# Patient Record
Sex: MALE | Employment: OTHER | URBAN - METROPOLITAN AREA
[De-identification: names, ages, dates, MRNs, and addresses within clinical notes are randomized per-mention and may not be internally consistent; named-entity substitution may affect disease eponyms.]

---

## 2023-06-29 RX ORDER — ACETAMINOPHEN 325 MG/1
650 TABLET ORAL EVERY 6 HOURS PRN
COMMUNITY

## 2023-06-29 RX ORDER — CICLOPIROX 7.7 MG/G
GEL TOPICAL 2 TIMES DAILY
COMMUNITY

## 2023-06-29 RX ORDER — CARBAMAZEPINE 200 MG/1
200 TABLET ORAL 2 TIMES DAILY
COMMUNITY

## 2023-06-29 RX ORDER — MAG HYDROX/ALUMINUM HYD/SIMETH 400-400-40
1 SUSPENSION, ORAL (FINAL DOSE FORM) ORAL AS NEEDED
COMMUNITY

## 2023-06-29 RX ORDER — POLYETHYLENE GLYCOL 3350
17 POWDER (GRAM) MISCELLANEOUS DAILY
COMMUNITY
End: 2023-07-05

## 2023-06-29 RX ORDER — GINSENG 100 MG
1 CAPSULE ORAL 2 TIMES DAILY
COMMUNITY

## 2023-06-29 RX ORDER — QUETIAPINE FUMARATE 200 MG/1
200 TABLET, FILM COATED ORAL 3 TIMES DAILY
COMMUNITY

## 2023-06-29 RX ORDER — OLANZAPINE 20 MG/1
20 TABLET ORAL
COMMUNITY

## 2023-06-29 RX ORDER — OLANZAPINE 20 MG/1
20 TABLET ORAL
COMMUNITY
End: 2023-07-05

## 2023-06-29 RX ORDER — MULTIVITAMIN
1 TABLET ORAL DAILY
COMMUNITY

## 2023-06-29 RX ORDER — DIPHENOXYLATE HYDROCHLORIDE AND ATROPINE SULFATE 2.5; .025 MG/1; MG/1
1 TABLET ORAL DAILY
COMMUNITY
End: 2023-07-05

## 2023-06-29 RX ORDER — DOCUSATE SODIUM 100 MG/1
100 CAPSULE, LIQUID FILLED ORAL
COMMUNITY

## 2023-06-29 RX ORDER — CLONAZEPAM 0.5 MG/1
0.5 TABLET ORAL 2 TIMES DAILY
COMMUNITY

## 2023-06-29 RX ORDER — CLONAZEPAM 1 MG/1
1 TABLET ORAL 2 TIMES DAILY
COMMUNITY

## 2023-06-29 RX ORDER — POLYETHYLENE GLYCOL 3350 17 G/17G
17 POWDER, FOR SOLUTION ORAL DAILY
COMMUNITY

## 2023-06-29 RX ORDER — PSEUDOEPHEDRINE HCL 30 MG
100 TABLET ORAL DAILY
COMMUNITY

## 2023-06-29 RX ORDER — PETROLATUM, YELLOW 100 %
JELLY (GRAM) MISCELLANEOUS 2 TIMES DAILY
COMMUNITY

## 2023-07-03 NOTE — PRE-PROCEDURE INSTRUCTIONS
My Surgical Experience    The following information was developed to assist you to prepare for your operation. What do I need to do before coming to the hospital?  • Arrange for a responsible person to drive you to and from the hospital.  • Arrange care for your children at home. Children are not allowed in the recovery areas of the hospital  • Plan to wear clothing that is easy to put on and take off. If you are having shoulder surgery, wear a shirt that buttons or zippers in the front  Bathing  o Shower the evening before and the morning of your surgery with an antibacterial soap. Please refer to the Pre Op Showering Instructions for Surgery Patients Sheet.  o Remove nail polish and all body piercing jewelry  o Do not shave any body part for at least 24 hours before surgery-this includes face, arms, legs and upper body  Food  o Nothing to eat or drink after midnight the night before your surgery. This includes candy and chewing gum  o Exception: If your surgery is after 12:00pm (noon), you may have clear liquids such as 7-Up®, ginger ale, apple or cranberry juice, Jell-O®, water, or clear broth until 8:00 am  o Do not drink milk or juice with pulp on the morning before surgery  o Do not drink alcohol 24 hours before surgery  Medicine  o Follow instructions you received from your surgeon about which medicines you may take on the day of surgery  o If instructed to take medicine on the morning of surgery, take pills with just a small sip of water. Call your prescribing doctor for specific infroamtion on what to do if you take insulin    What should I bring to the hospital?    Bring:  • Crutches or a walker, if you have them, for foot or knee surgery  • A list of the daily medicines, vitamins, minerals, herbals and nutritional supplements you take.  Include the dosages of medicines and the time you take them each day  • Glasses, dentures or hearing aids  • Minimal clothing; you will be wearing hospital sleepwear  • Photo ID; required to verify your identity  • If you have a Living Will or Power of , bring a copy of the documents  • If you have an ostomy, bring an extra pouch and any supplies you use    Do not bring  • Medicines or inhalers  • Money, valuables or jewelry    What other information should I know about the day of surgery? • Notify your surgeons if you develop a cold, sore throat, cough, fever, rash or any other illness. • Report to the Ambulatory Surgical/Same Day Surgery Unit  • You will be instructed to stop at Registration only if you have not been pre-registered  • Inform your  fi they do not stay that they will be asked by the staff to leave a phone number where they can be reached  • Be available to be reached before surgery. In the event the operating room schedule changes, you may be asked to come in earlier or later than expected    *It is important to tell your doctor and others involved in your health care if you are taking or have been taking any non-prescription drugs, vitamins, minerals, herbals or other nutritional supplements. Any of these may interact with some food or medicines and cause a reaction      Pre-Surgery Instructions:   Medication Instructions   • acetaminophen (TYLENOL) 325 mg tablet Hold day of surgery. • bacitracin topical ointment 500 units/g topical ointment Hold day of surgery. • carBAMazepine (TEGretol) 200 mg tablet Take day of surgery. • Ciclopirox 0.77 % gel Hold day of surgery. • clonazePAM (KlonoPIN) 0.5 mg tablet Take day of surgery. • docusate sodium (COLACE) 100 mg capsule Hold day of surgery. • Fexofenadine HCl (MUCINEX ALLERGY PO) Hold day of surgery. • glycerin adult 2 g suppository Hold day of surgery. • OLANZapine (ZyPREXA) 20 MG tablet Take day of surgery. • Polyethylene Glycol 3350 POWD Hold day of surgery. • QUEtiapine (SEROquel) 200 mg tablet Take day of surgery. • white petrolatum ointment Hold day of surgery.

## 2023-07-05 ENCOUNTER — ANESTHESIA EVENT (OUTPATIENT)
Dept: PERIOP | Facility: HOSPITAL | Age: 38
End: 2023-07-05
Payer: COMMERCIAL

## 2023-07-05 RX ORDER — POLYETHYLENE GLYCOL 3350 17 G/17G
17 POWDER, FOR SOLUTION ORAL
COMMUNITY

## 2023-07-06 ENCOUNTER — ANESTHESIA (OUTPATIENT)
Dept: PERIOP | Facility: HOSPITAL | Age: 38
End: 2023-07-06
Payer: COMMERCIAL

## 2023-07-06 ENCOUNTER — HOSPITAL ENCOUNTER (OUTPATIENT)
Facility: HOSPITAL | Age: 38
Setting detail: OUTPATIENT SURGERY
Discharge: ANOTHER HOSPITAL | End: 2023-07-06
Attending: DENTIST | Admitting: DENTIST
Payer: COMMERCIAL

## 2023-07-06 ENCOUNTER — APPOINTMENT (OUTPATIENT)
Dept: RADIOLOGY | Facility: HOSPITAL | Age: 38
End: 2023-07-06
Payer: COMMERCIAL

## 2023-07-06 VITALS
HEIGHT: 62 IN | HEART RATE: 97 BPM | SYSTOLIC BLOOD PRESSURE: 140 MMHG | RESPIRATION RATE: 16 BRPM | WEIGHT: 137.13 LBS | TEMPERATURE: 97.1 F | BODY MASS INDEX: 25.23 KG/M2 | DIASTOLIC BLOOD PRESSURE: 90 MMHG | OXYGEN SATURATION: 95 %

## 2023-07-06 PROBLEM — E78.5 HYPERLIPIDEMIA: Status: ACTIVE | Noted: 2023-07-06

## 2023-07-06 PROBLEM — E11.9 DIABETES MELLITUS, TYPE 2 (HCC): Status: ACTIVE | Noted: 2023-07-06

## 2023-07-06 PROBLEM — R56.9 SEIZURES (HCC): Status: ACTIVE | Noted: 2023-07-06

## 2023-07-06 RX ORDER — DEXAMETHASONE SODIUM PHOSPHATE 10 MG/ML
INJECTION, SOLUTION INTRAMUSCULAR; INTRAVENOUS AS NEEDED
Status: DISCONTINUED | OUTPATIENT
Start: 2023-07-06 | End: 2023-07-06

## 2023-07-06 RX ORDER — PROPOFOL 10 MG/ML
INJECTION, EMULSION INTRAVENOUS AS NEEDED
Status: DISCONTINUED | OUTPATIENT
Start: 2023-07-06 | End: 2023-07-06

## 2023-07-06 RX ORDER — ROCURONIUM BROMIDE 10 MG/ML
INJECTION, SOLUTION INTRAVENOUS AS NEEDED
Status: DISCONTINUED | OUTPATIENT
Start: 2023-07-06 | End: 2023-07-06

## 2023-07-06 RX ORDER — MAGNESIUM HYDROXIDE 1200 MG/15ML
LIQUID ORAL AS NEEDED
Status: DISCONTINUED | OUTPATIENT
Start: 2023-07-06 | End: 2023-07-06 | Stop reason: HOSPADM

## 2023-07-06 RX ORDER — ONDANSETRON 2 MG/ML
INJECTION INTRAMUSCULAR; INTRAVENOUS AS NEEDED
Status: DISCONTINUED | OUTPATIENT
Start: 2023-07-06 | End: 2023-07-06

## 2023-07-06 RX ORDER — BUPIVACAINE HYDROCHLORIDE AND EPINEPHRINE 5; 5 MG/ML; UG/ML
INJECTION, SOLUTION PERINEURAL AS NEEDED
Status: DISCONTINUED | OUTPATIENT
Start: 2023-07-06 | End: 2023-07-06 | Stop reason: HOSPADM

## 2023-07-06 RX ORDER — SODIUM CHLORIDE, SODIUM LACTATE, POTASSIUM CHLORIDE, CALCIUM CHLORIDE 600; 310; 30; 20 MG/100ML; MG/100ML; MG/100ML; MG/100ML
125 INJECTION, SOLUTION INTRAVENOUS CONTINUOUS
Status: DISCONTINUED | OUTPATIENT
Start: 2023-07-06 | End: 2023-07-06 | Stop reason: HOSPADM

## 2023-07-06 RX ORDER — CLINDAMYCIN PHOSPHATE 600 MG/50ML
600 INJECTION INTRAVENOUS
Status: DISCONTINUED | OUTPATIENT
Start: 2023-07-06 | End: 2023-07-06 | Stop reason: HOSPADM

## 2023-07-06 RX ORDER — FENTANYL CITRATE 50 UG/ML
INJECTION, SOLUTION INTRAMUSCULAR; INTRAVENOUS AS NEEDED
Status: DISCONTINUED | OUTPATIENT
Start: 2023-07-06 | End: 2023-07-06

## 2023-07-06 RX ORDER — LIDOCAINE HYDROCHLORIDE 10 MG/ML
INJECTION, SOLUTION EPIDURAL; INFILTRATION; INTRACAUDAL; PERINEURAL AS NEEDED
Status: DISCONTINUED | OUTPATIENT
Start: 2023-07-06 | End: 2023-07-06

## 2023-07-06 RX ORDER — DIPHENHYDRAMINE HCL 25 MG
25 CAPSULE ORAL 3 TIMES DAILY
COMMUNITY

## 2023-07-06 RX ADMIN — ONDANSETRON 4 MG: 2 INJECTION INTRAMUSCULAR; INTRAVENOUS at 14:44

## 2023-07-06 RX ADMIN — PROPOFOL 150 MG: 10 INJECTION, EMULSION INTRAVENOUS at 14:34

## 2023-07-06 RX ADMIN — LIDOCAINE HYDROCHLORIDE 50 MG: 10 INJECTION, SOLUTION EPIDURAL; INFILTRATION; INTRACAUDAL; PERINEURAL at 14:34

## 2023-07-06 RX ADMIN — SODIUM CHLORIDE, SODIUM LACTATE, POTASSIUM CHLORIDE, AND CALCIUM CHLORIDE 125 ML/HR: .6; .31; .03; .02 INJECTION, SOLUTION INTRAVENOUS at 14:26

## 2023-07-06 RX ADMIN — SUGAMMADEX 200 MG: 100 INJECTION, SOLUTION INTRAVENOUS at 15:55

## 2023-07-06 RX ADMIN — DEXAMETHASONE SODIUM PHOSPHATE 10 MG: 10 INJECTION, SOLUTION INTRAMUSCULAR; INTRAVENOUS at 14:44

## 2023-07-06 RX ADMIN — FENTANYL CITRATE 50 MCG: 50 INJECTION, SOLUTION INTRAMUSCULAR; INTRAVENOUS at 14:34

## 2023-07-06 RX ADMIN — ROCURONIUM BROMIDE 40 MG: 10 INJECTION, SOLUTION INTRAVENOUS at 14:34

## 2023-07-06 RX ADMIN — CLINDAMYCIN PHOSPHATE 600 MG: 600 INJECTION, SOLUTION INTRAVENOUS at 14:27

## 2023-07-06 NOTE — OP NOTE
COMPLETE ORAL REHABILITATION, ORAL EXAM UNDER ANESTHESIA, FULL MOUTH DENTAL X-RAYS, PLANING AND SCALING, GINGIVECTOMY, HE CHARTING, PROPHYLAXIS, D/SENSE, COMPOSITE RESTORATION  #21, DFIL#7. MFIL #8, EXTRACTION TEETH # 9, #10, AND #31. Postoperative Note  PATIENT NAME: Shira Mclaughlin  : 1985  MRN: [id number]  [address] [address]    Surgery Date: 2023    Unspecified intellectual disabilities [F79]  Chronic periodontitis, unspecified [K05.30]    Post-Op Diagnosis Codes:     * Unspecified intellectual disabilities [F79]     * Chronic periodontitis, unspecified [K05.30]    Procedure(s):  COMPLETE ORAL REHABILITATION, ORAL EXAM UNDER ANESTHESIA, FULL MOUTH DENTAL X-RAYS, PLANING AND SCALING, GINGIVECTOMY, HE CHARTING, PROPHYLAXIS, D/SENSE, COMPOSITE RESTORATION  #21, DFIL#7. MFIL #8, EXTRACTION TEETH # 9, #10, AND #31. Surgeon(s) and Role:     * New Cano DMD - Primary    Specimens:  none    Estimated Blood Loss:   Minimal    Anesthesia Type:   General     Operative report:            Complications:   None    SIGNATURE: New Cano DMD   DATE: 2023   TIME: 3:59 PM COMPLETE ORAL REHABILITATION, ORAL EXAM UNDER ANESTHESIA, FULL MOUTH DENTAL X-RAYS, PLANING AND SCALING, GINGIVECTOMY, HE CHARTING, PROPHYLAXIS, D/SENSE, COMPOSITE RESTORATION  #21, DFIL#7. MFIL #8, EXTRACTION TEETH # 9, #10, AND #31. Postoperative Note  PATIENT NAME: Shira Mclaughlin  : 1985  MRN: [id number]  [address] [address]    Surgery Date: 2023    Unspecified intellectual disabilities [F79]  Chronic periodontitis, unspecified [K05.30]    Post-Op Diagnosis Codes:     * Unspecified intellectual disabilities [F79]     * Chronic periodontitis, unspecified [K05.30]    Procedure(s):  COMPLETE ORAL REHABILITATION, ORAL EXAM UNDER ANESTHESIA, FULL MOUTH DENTAL X-RAYS, PLANING AND SCALING, GINGIVECTOMY, HE CHARTING, PROPHYLAXIS, D/SENSE, COMPOSITE RESTORATION  #21, DFIL#7.  MFIL #8, EXTRACTION TEETH # 9, #10, AND #31. Surgeon(s) and Role:     * Kita Naranjo DMD - Primary    Specimens:  none    Estimated Blood Loss:   Minimal    Anesthesia Type:   General     Operative report:            Complications:   None    SIGNATURE: Kitaeliana Naranjo DMD   DATE: 2023   TIME: 3:59 PMOPERATIVE REPORT  PATIENT NAME: Karl Salazar    :  1985  MRN: [id number]  Pt Location: [facility] OR ROOM 02    SURGERY DATE: 2023    Surgeon(s) and Role:     * Kita Naranjo DMD - Primary    Preop Diagnosis:  Unspecified intellectual disabilities [F79]  Chronic periodontitis, unspecified [K05.30]    Post-Op Diagnosis Codes:     * Unspecified intellectual disabilities [F79]     * Chronic periodontitis, unspecified [D00.26]    Procedure(s):  COMPLETE ORAL REHABILITATION. ORAL EXAM UNDER ANESTHESIA. FULL MOUTH DENTAL X-RAYS. PLANING AND SCALING. GINGIVECTOMY. HE CHARTING. PROPHYLAXIS. D/SENSE. COMPOSITE RESTORATION  #21. DFIL#7. MFIL #8. EXTRACTION TEETH # 9. #10. AND #31. Specimen(s):  * No specimens in log *    Estimated Blood Loss:   Minimal    Drains:  * No LDAs found *    Anesthesia Type:   General    Operative Indications:  Unspecified intellectual disabilities [F79]  Chronic periodontitis, unspecified [K05.30]  Chronic adult periodontitis, multiple carious teeth, multiple abscessed teeth, severe gingival hyperplasia, malocclusion. Operative Findings:      Complications:   None    Procedure and Technique: Following induction of IV inhalation anesthesia with nasotracheal intubation, this patient was prepped and draped for an intraoral dental surgical procedure. 14 periapical dental radiographs were exposed and stored. Preliminary treatment plan formulated at that time. Following insertion of a posterior pharyngeal pack with Ray-Jessica gauze single tail moistened debridement of the dentition was completed utilizing ultrasonic scaling with the [address] Z Cavitron unit.   Both large and slim tip inserts were utilized. Hand-held curettes and 's use were appropriate. Comprehensive evaluation of the patient's oral cavity and teeth was then completed. This information was related to the aforementioned radiographic survey whereby definitive treatment plan was then set forth following administration of 10.8 mL of Marcaine 0.5% epi concentration 1-200,000 patient had gingivectomy surgery in the following 3 quadrants upper right, upper left, lower left. The Bovie electrocautery unit was utilized the setting was #25 for both cutting and coagulation. [place] tip-117 was utilized and hemostasis achieved in a coagulative modality. Attention was then directed to carious teeth. These were restored using composite resin material Shade a 3.5. Tooth #21 mesial occlusal distal and buccal surfaces, tooth #7 distal facial incisal and lingual surfaces, tooth #8 mesial facial incisal and lingual surfaces, tooth #2 distal occlusal surfaces, tooth #3 distal occlusal surfaces, tooth #4 distal occlusal surfaces. Attention was then directed to nonrestorable and abscessed teeth. These were removed using standard elevators and forceps. Teeth numbers 9, 10, and 31 were extracted. Granulomatous tissue was removed with rongeur and bone file and curette. Gelfoam and ten 3-0 Vicryl sutures used for closure. This patient also had adult prophylaxis, desensitizing medicaments fluoride treatment performed for all remaining dentition. This patient did receive 600 mg of Cleocin intravenously during the surgery. The posterior pharyngeal pack was removed and the oropharyngeal area suction irrigated with sterile saline in usual manner. The patient left the operating room in satisfactory condition and was transported to the PACU without incident. I was present for the entire procedure.     Patient Disposition:  PACU         SIGNATURE: Oly Correia DMD  DATE: July 6, 2023  TIME: 3:59 PM

## 2023-07-06 NOTE — DISCHARGE INSTRUCTIONS
DISCHARGE INSTRUCTIONS  DENTAL PROCEDURE      1. Saline rinses 4x per day for 10 days    2. Do not use drinking straws for next 72 hrs if you had any dental extractions    3. Soft diet for 24 hours    4. May return to previous work and activity in  24-48 hours    5.   Return to office for follow-up on 7-10 days

## 2023-07-06 NOTE — ANESTHESIA POSTPROCEDURE EVALUATION
Post-Op Assessment Note    CV Status:  Stable       Mental Status:  Sleepy   Hydration Status:  Stable   PONV Controlled:  Controlled   Airway Patency:  Patent      Post Op Vitals Reviewed: Yes      Staff: CRNA         No notable events documented.     BP   158/93   Temp      Pulse  91   Resp   16   SpO2   92

## 2023-07-06 NOTE — ANESTHESIA PREPROCEDURE EVALUATION
Procedure:  COMPLETE ORAL REHABILITATION (Mouth)    Relevant Problems   CARDIO   (+) Hyperlipidemia      ENDO   (+) Diabetes mellitus, type 2 (HCC)      NEURO/PSYCH   (+) Seizures (HCC)        Physical Exam    Airway    Mallampati score: II  TM Distance: >3 FB  Neck ROM: full     Dental   No notable dental hx     Cardiovascular  Cardiovascular exam normal    Pulmonary  Pulmonary exam normal     Other Findings        Anesthesia Plan  ASA Score- 3     Anesthesia Type- general with ASA Monitors. Additional Monitors:   Airway Plan: NTT. Plan Factors-    Chart reviewed. Existing labs reviewed. Patient summary reviewed. Patient is not a current smoker. Induction- intravenous. Postoperative Plan- Plan for postoperative opioid use. Informed Consent- Anesthetic plan and risks discussed with legal guardian. I personally reviewed this patient with the CRNA. Discussed and agreed on the Anesthesia Plan with the CRNA. [place]

## 2023-07-06 NOTE — QUICK NOTE
Patient seen and examined in same-day surgery center. Patient was clinically evaluated and all of paper chart was reviewed. Patient was not found to have any sort of changes in the last 30 days since they were seen and evaluated by their primary care provider. Patient is still consistent with findings of being medically cleared for procedure.     /70   Pulse 87   Temp 98.7 °F (37.1 °C) (Skin)   Resp 18   Ht 5' 1.5" (1.562 m)   Wt 64.7 kg (142 lb 11.2 oz)   SpO2 95%   BMI 26.53 kg/m²

## 2023-07-06 NOTE — INTERVAL H&P NOTE
H&P reviewed. After examining the patient I find no changes in the patients condition since the H&P had been written.     Vitals:    07/06/23 1130   BP: 132/70   Pulse: 87   Resp: 18   Temp: 98.7 °F (37.1 °C)   SpO2: 95%

## 2024-10-09 RX ORDER — LOPERAMIDE HYDROCHLORIDE 2 MG/1
2 CAPSULE ORAL 4 TIMES DAILY PRN
COMMUNITY

## 2024-10-09 RX ORDER — DIPHENHYDRAMINE HCL 50 MG
50 CAPSULE ORAL
COMMUNITY

## 2024-10-09 RX ORDER — PSEUDOEPHEDRINE HCL 30 MG/1
60 TABLET, FILM COATED ORAL EVERY 4 HOURS PRN
COMMUNITY

## 2024-10-09 RX ORDER — POLYVINYL ALCOHOL, POVIDONE 14; 6 MG/ML; MG/ML
1 SOLUTION/ DROPS OPHTHALMIC
COMMUNITY

## 2024-10-14 ENCOUNTER — ANESTHESIA EVENT (OUTPATIENT)
Dept: PERIOP | Facility: HOSPITAL | Age: 39
End: 2024-10-14
Payer: COMMERCIAL

## 2024-10-14 NOTE — PRE-PROCEDURE INSTRUCTIONS
Pre-Surgery Instructions:   Medication Instructions    acetaminophen (TYLENOL) 325 mg tablet Uses PRN- OK to take day of surgery    bacitracin topical ointment 500 units/g topical ointment Hold day of surgery.    carBAMazepine (TEGretol) 200 mg tablet Take day of surgery.    clonazePAM (KlonoPIN) 1 mg tablet Take day of surgery.    CLONAZEPAM PO Uses PRN- OK to take day of surgery    Dextromethorphan HBr 15 MG/5ML LIQD Hold day of surgery.    diphenhydrAMINE (BENADRYL) 25 mg capsule Takes @2pm    diphenhydrAMINE (BENADRYL) 50 mg capsule Take night before surgery    docusate sodium (COLACE) 100 mg capsule Take night before surgery    glycerin adult 2 g suppository Hold day of surgery.    loperamide (IMODIUM) 2 mg capsule Uses PRN- OK to take day of surgery    Multiple Vitamin (multivitamin) tablet Stop taking 7 days prior to surgery.    NON FORMULARY Take night before surgery    OLANZAPINE PO Take day of surgery.    polyethylene glycol (MIRALAX) 17 g packet Hold day of surgery.    Polyvinyl Alcohol-Povidone PF (Refresh) 1.4-0.6 % SOLN Take day of surgery.    pseudoephedrine (SUDAFED) 30 mg tablet Hold day of surgery.    QUEtiapine (SEROquel) 200 mg tablet Take day of surgery.    white petrolatum ointment Hold day of surgery.    Medication instructions for day surgery reviewed. Please use only a sip of water to take your instructed medications. Avoid all over the counter vitamins, supplements and NSAIDS for one week prior to surgery per anesthesia guidelines. Tylenol is ok to take as needed.     You will receive a call one business day prior to surgery with an arrival time and hospital directions. If your surgery is scheduled on a Monday, the hospital will be calling you on the Friday prior to your surgery. If you have not heard from anyone by 8pm, please call the hospital supervisor through the hospital  at [phone]. ([place] [phone] or [place] [phone]).    Do not eat or drink anything after  midnight the night before your surgery, including candy, mints, lifesavers, or chewing gum. Do not drink alcohol 24hrs before your surgery. Try not to smoke at least 24hrs before your surgery.       Follow the pre surgery showering instructions as listed in the “My Surgical Experience Booklet” or otherwise provided by your surgeon's office. Do not use a blade to shave the surgical area 1 week before surgery. It is okay to use a clean electric clippers up to 24 hours before surgery. Do not apply any lotions, creams, including makeup, cologne, deodorant, or perfumes after showering on the day of your surgery. Do not use dry shampoo, hair spray, hair gel, or any type of hair products.     No contact lenses, eye make-up, or artificial eyelashes. Remove nail polish, including gel polish, and any artificial, gel, or acrylic nails if possible. Remove all jewelry including rings and body piercing jewelry.     Wear causal clothing that is easy to take on and off. Consider your type of surgery.    Keep any valuables, jewelry, piercings at home. Please bring any specially ordered equipment (sling, braces) if indicated.    Arrange for a responsible person to drive you to and from the hospital on the day of your surgery. Please confirm the visitor policy for the day of your procedure when you receive your phone call with an arrival time.     Call the surgeon's office with any new illnesses, exposures, or additional questions prior to surgery.    Please reference your “My Surgical Experience Booklet” for additional information to prepare for your upcoming surgery.

## 2024-11-01 ENCOUNTER — HOSPITAL ENCOUNTER (OUTPATIENT)
Facility: HOSPITAL | Age: 39
Setting detail: OUTPATIENT SURGERY
Discharge: NURSING, REHAB OR LONG-TERM CARE | End: 2024-11-01
Attending: DENTIST | Admitting: DENTIST
Payer: COMMERCIAL

## 2024-11-01 ENCOUNTER — ANESTHESIA (OUTPATIENT)
Dept: PERIOP | Facility: HOSPITAL | Age: 39
End: 2024-11-01
Payer: COMMERCIAL

## 2024-11-01 ENCOUNTER — APPOINTMENT (OUTPATIENT)
Dept: RADIOLOGY | Facility: HOSPITAL | Age: 39
End: 2024-11-01
Payer: COMMERCIAL

## 2024-11-01 VITALS
HEART RATE: 86 BPM | TEMPERATURE: 97.3 F | WEIGHT: 130 LBS | DIASTOLIC BLOOD PRESSURE: 81 MMHG | BODY MASS INDEX: 25.52 KG/M2 | RESPIRATION RATE: 19 BRPM | OXYGEN SATURATION: 97 % | SYSTOLIC BLOOD PRESSURE: 147 MMHG | HEIGHT: 60 IN

## 2024-11-01 RX ORDER — ONDANSETRON 2 MG/ML
4 INJECTION INTRAMUSCULAR; INTRAVENOUS ONCE AS NEEDED
Status: DISCONTINUED | OUTPATIENT
Start: 2024-11-01 | End: 2024-11-01 | Stop reason: HOSPADM

## 2024-11-01 RX ORDER — BUPIVACAINE HYDROCHLORIDE AND EPINEPHRINE 5; 5 MG/ML; UG/ML
INJECTION, SOLUTION PERINEURAL AS NEEDED
Status: DISCONTINUED | OUTPATIENT
Start: 2024-11-01 | End: 2024-11-01 | Stop reason: HOSPADM

## 2024-11-01 RX ORDER — CLINDAMYCIN PHOSPHATE 600 MG/50ML
600 INJECTION, SOLUTION INTRAVENOUS
Status: DISCONTINUED | OUTPATIENT
Start: 2024-11-01 | End: 2024-11-01 | Stop reason: HOSPADM

## 2024-11-01 RX ORDER — DEXAMETHASONE SODIUM PHOSPHATE 10 MG/ML
INJECTION, SOLUTION INTRAMUSCULAR; INTRAVENOUS AS NEEDED
Status: DISCONTINUED | OUTPATIENT
Start: 2024-11-01 | End: 2024-11-01

## 2024-11-01 RX ORDER — PROPOFOL 10 MG/ML
INJECTION, EMULSION INTRAVENOUS AS NEEDED
Status: DISCONTINUED | OUTPATIENT
Start: 2024-11-01 | End: 2024-11-01

## 2024-11-01 RX ORDER — LIDOCAINE HYDROCHLORIDE 10 MG/ML
INJECTION, SOLUTION EPIDURAL; INFILTRATION; INTRACAUDAL; PERINEURAL AS NEEDED
Status: DISCONTINUED | OUTPATIENT
Start: 2024-11-01 | End: 2024-11-01

## 2024-11-01 RX ORDER — FENTANYL CITRATE 50 UG/ML
INJECTION, SOLUTION INTRAMUSCULAR; INTRAVENOUS AS NEEDED
Status: DISCONTINUED | OUTPATIENT
Start: 2024-11-01 | End: 2024-11-01

## 2024-11-01 RX ORDER — MAGNESIUM HYDROXIDE 1200 MG/15ML
LIQUID ORAL AS NEEDED
Status: DISCONTINUED | OUTPATIENT
Start: 2024-11-01 | End: 2024-11-01 | Stop reason: HOSPADM

## 2024-11-01 RX ORDER — ONDANSETRON 2 MG/ML
INJECTION INTRAMUSCULAR; INTRAVENOUS AS NEEDED
Status: DISCONTINUED | OUTPATIENT
Start: 2024-11-01 | End: 2024-11-01

## 2024-11-01 RX ORDER — SODIUM CHLORIDE, SODIUM LACTATE, POTASSIUM CHLORIDE, CALCIUM CHLORIDE 600; 310; 30; 20 MG/100ML; MG/100ML; MG/100ML; MG/100ML
125 INJECTION, SOLUTION INTRAVENOUS CONTINUOUS
Status: DISCONTINUED | OUTPATIENT
Start: 2024-11-01 | End: 2024-11-01 | Stop reason: HOSPADM

## 2024-11-01 RX ORDER — ROCURONIUM BROMIDE 10 MG/ML
INJECTION, SOLUTION INTRAVENOUS AS NEEDED
Status: DISCONTINUED | OUTPATIENT
Start: 2024-11-01 | End: 2024-11-01

## 2024-11-01 RX ADMIN — DEXAMETHASONE SODIUM PHOSPHATE 10 MG: 10 INJECTION, SOLUTION INTRAMUSCULAR; INTRAVENOUS at 09:27

## 2024-11-01 RX ADMIN — SUGAMMADEX 300 MG: 100 INJECTION, SOLUTION INTRAVENOUS at 10:35

## 2024-11-01 RX ADMIN — PHENYLEPHRINE HYDROCHLORIDE 3 DROP: 1 SPRAY NASAL at 09:23

## 2024-11-01 RX ADMIN — PROPOFOL 150 MG: 10 INJECTION, EMULSION INTRAVENOUS at 09:23

## 2024-11-01 RX ADMIN — FENTANYL CITRATE 100 MCG: 50 INJECTION, SOLUTION INTRAMUSCULAR; INTRAVENOUS at 09:21

## 2024-11-01 RX ADMIN — CLINDAMYCIN PHOSPHATE 600 MG: 600 INJECTION, SOLUTION INTRAVENOUS at 09:17

## 2024-11-01 RX ADMIN — SODIUM CHLORIDE, SODIUM LACTATE, POTASSIUM CHLORIDE, AND CALCIUM CHLORIDE: .6; .31; .03; .02 INJECTION, SOLUTION INTRAVENOUS at 09:17

## 2024-11-01 RX ADMIN — ROCURONIUM BROMIDE 50 MG: 10 INJECTION, SOLUTION INTRAVENOUS at 09:24

## 2024-11-01 RX ADMIN — LIDOCAINE HYDROCHLORIDE 50 MG: 10 INJECTION, SOLUTION EPIDURAL; INFILTRATION; INTRACAUDAL; PERINEURAL at 09:22

## 2024-11-01 RX ADMIN — ONDANSETRON 4 MG: 2 INJECTION INTRAMUSCULAR; INTRAVENOUS at 09:27

## 2024-11-01 NOTE — ANESTHESIA PREPROCEDURE EVALUATION
Procedure:  COMPLETE ORAL REHABILITATION (Mouth)    Relevant Problems   CARDIO   (+) Hyperlipidemia      ENDO   (+) Diabetes mellitus, type 2 (HCC)      NEURO/PSYCH   (+) Seizures (HCC)        Physical Exam    Airway    Mallampati score: II  TM Distance: <3 FB  Neck ROM: full     Dental   No notable dental hx     Cardiovascular  Rhythm: regular, Rate: normal, Cardiovascular exam normal    Pulmonary  Pulmonary exam normal Breath sounds clear to auscultation    Other Findings        Anesthesia Plan  ASA Score- 3     Anesthesia Type- general with ASA Monitors.         Additional Monitors:     Airway Plan: NTT.           Plan Factors-Exercise tolerance (METS): >4 METS.    Chart reviewed.   Existing labs reviewed. Patient summary reviewed.    Patient is not a current smoker.              Induction- intravenous.    Postoperative Plan- Plan for postoperative opioid use.         Informed Consent- Anesthetic plan and risks discussed with legal guardian, healthcare power of  and patient.  I personally reviewed this patient with the CRNA. Discussed and agreed on the Anesthesia Plan with the CRNA..

## 2024-11-01 NOTE — OP NOTE
COMPLETE ORAL REHABILITATION, DENTAL EXAM, DENTAL XRAYS - FULL MOUTH, PLANING AND SCALING, GINGIVECTOMY, PERIO CHARTING, COMPOSITE RESTORATION #6MFD, #20MOB,  PROPHYLAXIS, D/SENSE, FLUORIDE, EXTRACTION # 2,3,4,7,8, 21  Postoperative Note  PATIENT NAME: Maco Bland  : 1985  MRN: [id number]  [facility] OR ROOM 02    Surgery Date: 2024    Unspecified intellectual disabilities [F79]  Chronic periodontitis, unspecified [K05.30]    Post-Op Diagnosis Codes:     * Unspecified intellectual disabilities [F79]     * Chronic periodontitis, unspecified [K05.30]    Procedure(s):  COMPLETE ORAL REHABILITATION, DENTAL EXAM, DENTAL XRAYS - FULL MOUTH, PLANING AND SCALING, GINGIVECTOMY, PERIO CHARTING, COMPOSITE RESTORATION #6MFD, #20MOB,  PROPHYLAXIS, D/SENSE, FLUORIDE, EXTRACTION # 2,3,4,7,8, 21    Surgeons and Role:     * Rey Daniels DMD - Primary    Specimens:  none    Estimated Blood Loss:   Minimal    Anesthesia Type:   General     Operative report:            Complications:   None    SIGNATURE: Rey Daniels DMD   DATE: 2024   TIME: 10:39 AMOPERATIVE REPORT  PATIENT NAME: Maco Bland    :  1985  MRN: [id number]  Pt Location: [facility] 02    SURGERY DATE: 2024    Surgeons and Role:     * Rey Daniels DMD - Primary    Preop Diagnosis:  Unspecified intellectual disabilities [F79]  Chronic periodontitis, unspecified [K05.30]    Post-Op Diagnosis Codes:     * Unspecified intellectual disabilities [F79]     * Chronic periodontitis, unspecified [K05.30]    Procedure(s):  COMPLETE ORAL REHABILITATION. DENTAL EXAM. DENTAL XRAYS - FULL MOUTH. PLANING AND SCALING. GINGIVECTOMY. PERIO CHARTING. COMPOSITE RESTORATION #6MFD. #20MOB.  PROPHYLAXIS. D/SENSE. FLUORIDE. EXTRACTION # 2.3.4.7.8. 21    Specimen(s):  * No specimens in log *    Estimated Blood Loss:   Minimal    Drains:  * No LDAs found *    Anesthesia Type:   General    Operative Indications:  Unspecified intellectual  disabilities [F79]  Chronic periodontitis, unspecified [K05.30]  Chronic adult periodontitis, dental caries, multiple abscessed teeth, gingival hyperplasia, class III malocclusion, bruxism attrition.    Operative Findings:        Complications:   None    Procedure and Technique:  Following the induction of IV inhalation anesthesia with nasotracheal intubation, this patient was prepped and draped for an intraoral dental surgical procedure.  14 periapical dental radiographs were exposed and stored.  Preliminary treatment plan for made at that time.  Following insertion of a posterior pharyngeal pack with Ray-Jessica gauze single tail moistened debridement of the dentition was completed utilizing ultrasonic scaling with the 30 KH Z Cavitron unit.  Both large and slim tip inserts were utilized.  Hand-held curettes and 's use were appropriate.  Comprehensive evaluation the patient's oral cavity and teeth was then completed.  This information was related to the aforementioned radiographic survey whereby definitive treatment plan was then set forth.  Following administration of 10.8 mL of Marcaine 0.5% epi concentration 1-200,000 patient had gingivectomy surgery in the upper left and lower left quadrants.  The Bovie electrocautery unit was utilized the setting was #25 for cutting and coagulation.  [place] tip-117 was utilized hemostasis achieved in a coagulative modality.  Attention was then directed to carious teeth.  These were restored using composite resin material Shade a 3.5.  Tooth #6 mesial facial and distal surfaces, tooth #20 mesial occlusal and buccal surfaces.  Attention was then directed to abscessed and nonrestorable teeth.  These were extracted using standard elevators and forceps Gelfoam and ten 3-0 Vicryl sutures used for closure.  Teeth numbers 2, 3, 4, 7, 8, and 21 were extracted interseptal bone was removed with a bone file and rongeur.  This patient also had adult prophylaxis, desensitizing and  medicaments and fluoride treatment performed for all remaining dentition.  This patient did receive 600 mg of Cleocin intravenously during the surgery.  The posterior pharyngeal pack was removed and the oropharyngeal area suction irrigated with sterile saline in usual manner.  The patient left the operating room in satisfactory condition and was transported to the PACU without incident.       Patient Disposition:  PACU              SIGNATURE: Rey Daniels DMD  DATE: November 1, 2024  TIME: 10:39 AM

## 2024-11-01 NOTE — QUICK NOTE
"    H&P Interval Update      H&P performed by Dr. Basim Mercedes on 10/15/24 was reviewed.     After examining the patient I find no changes in the patients condition since the H&P had been written.     /77   Pulse 93   Temp 98 °F (36.7 °C) (Temporal)   Resp 16   Ht 4' 7\" (1.397 m) Comment: per form from facility  Wt 59 kg (130 lb)   SpO2 95%   BMI 30.21 kg/m²             General: sleeping but arousable, no acute distress   Head: atraumatic  Heart:  regular rate and rhythm  Lungs: Clear to auscultation bilaterally  Abdomen:  soft, non distended, no tenderness to palpation  Extremities:  no erythema, no edema    "

## 2024-11-01 NOTE — ANESTHESIA POSTPROCEDURE EVALUATION
Post-Op Assessment Note    CV Status:  Stable  Pain Score: 0    Pain management: adequate       Mental Status:  Sleepy   Hydration Status:  Stable   PONV Controlled:  None   Airway Patency:  Patent     Post Op Vitals Reviewed: Yes    No anethesia notable event occurred.    Staff: Anesthesiologist, CRNA           Last Filed PACU Vitals:  Vitals Value Taken Time   Temp     Pulse 70    /60    Resp 14    SpO2 98        Modified Jose Guadalupe:  No data recorded

## 2024-11-01 NOTE — ANESTHESIA POSTPROCEDURE EVALUATION
Post-Op Assessment Note    CV Status:  Stable  Pain Score: 1    Pain management: adequate       Mental Status:  Alert and awake   Hydration Status:  Euvolemic and stable   PONV Controlled:  Controlled   Airway Patency:  Patent     Post Op Vitals Reviewed: Yes    No anethesia notable event occurred.    Staff: Anesthesiologist           Last Filed PACU Vitals:  Vitals Value Taken Time   Temp 97.3 °F (36.3 °C) 11/01/24 1100   Pulse 78 11/01/24 1145   /88 11/01/24 1145   Resp 20 11/01/24 1145   SpO2 94 % 11/01/24 1145       Modified Jose Guadalupe:  Activity: 2 (11/1/2024  1:30 PM)  Respiration: 2 (11/1/2024  1:30 PM)  Circulation: 2 (11/1/2024  1:30 PM)  Consciousness: 2 (11/1/2024  1:30 PM)  Oxygen Saturation: 2 (11/1/2024  1:30 PM)  Modified Jose Guadalupe Score: 10 (11/1/2024  1:30 PM)

## (undated) DEVICE — ACCLEAN PROPHY PASTE COARSE: Brand: HENRY SCHEIN

## (undated) DEVICE — MONOJECT NEEDLES 27 GA SHORT METAL HUB

## (undated) DEVICE — 3/8 INCH SMOKE TUBING

## (undated) DEVICE — ASTOUND STANDARD SURGICAL GOWN, XL: Brand: CONVERTORS

## (undated) DEVICE — GLOVE INDICATOR PI UNDERGLOVE SZ 7.5 BLUE

## (undated) DEVICE — ELECTRODE NEEDLE MEGAFINE E-Z CLEAN 2IN 45DEG -0119

## (undated) DEVICE — STANDARD SURGICAL GOWN, L: Brand: CONVERTORS

## (undated) DEVICE — DENTAL PACK: Brand: CARDINAL HEALTH

## (undated) DEVICE — GLOVE INDICATOR PI UNDERGLOVE SZ 8.5 BLUE

## (undated) DEVICE — PENCIL ELECTROSURG E-Z CLEAN -0035H

## (undated) DEVICE — DISPOS-A-BRUSH FINE BRISTLE

## (undated) DEVICE — DISPOS-A BRUSH STANDARD

## (undated) DEVICE — Device

## (undated) DEVICE — GLOVE SRG BIOGEL 8

## (undated) DEVICE — SYRINGE BRIXTON AIRWATER SLEEVE CLEAR

## (undated) DEVICE — SURGIFOAM 7 X 12 SPONGE ABS

## (undated) DEVICE — SPECIMEN SOCK - SHORT: Brand: MEDI-VAC

## (undated) DEVICE — GLOVE SRG BIOGEL 7.5

## (undated) DEVICE — D/SENSE CRYSTAL SYRINGE

## (undated) DEVICE — PROPHY ANGLE FIRM WHITE DISP LF

## (undated) DEVICE — SUT VICRYL 3-0 PS-1 18 IN J683G

## (undated) DEVICE — SLEEVE CURE HANDLE COVER MODEL 4551